# Patient Record
Sex: FEMALE | Race: WHITE | Employment: UNEMPLOYED | URBAN - METROPOLITAN AREA
[De-identification: names, ages, dates, MRNs, and addresses within clinical notes are randomized per-mention and may not be internally consistent; named-entity substitution may affect disease eponyms.]

---

## 2021-08-02 ENCOUNTER — HOSPITAL ENCOUNTER (OUTPATIENT)
Dept: PHYSICAL THERAPY | Age: 66
Setting detail: THERAPIES SERIES
Discharge: HOME | End: 2021-08-02
Payer: MEDICARE

## 2021-08-02 PROCEDURE — 97161 PT EVAL LOW COMPLEX 20 MIN: CPT

## 2021-08-02 PROCEDURE — 97110 THERAPEUTIC EXERCISES: CPT

## 2021-08-02 ASSESSMENT — PAIN DESCRIPTION - PROGRESSION: CLINICAL_PROGRESSION: GRADUALLY WORSENING

## 2021-08-02 ASSESSMENT — PAIN DESCRIPTION - FREQUENCY: FREQUENCY: INTERMITTENT

## 2021-08-02 ASSESSMENT — PAIN DESCRIPTION - ONSET: ONSET: AWAKENED FROM SLEEP

## 2021-08-02 ASSESSMENT — PAIN DESCRIPTION - LOCATION: LOCATION: SHOULDER

## 2021-08-02 ASSESSMENT — PAIN DESCRIPTION - PAIN TYPE: TYPE: ACUTE PAIN;CHRONIC PAIN

## 2021-08-02 ASSESSMENT — PAIN SCALES - GENERAL: PAINLEVEL_OUTOF10: 0

## 2021-08-02 ASSESSMENT — PAIN - FUNCTIONAL ASSESSMENT: PAIN_FUNCTIONAL_ASSESSMENT: PREVENTS OR INTERFERES SOME ACTIVE ACTIVITIES AND ADLS

## 2021-08-02 ASSESSMENT — PAIN DESCRIPTION - DESCRIPTORS: DESCRIPTORS: SHOOTING;SHARP;ACHING;BURNING

## 2021-08-02 ASSESSMENT — PAIN DESCRIPTION - ORIENTATION: ORIENTATION: LEFT

## 2021-08-02 NOTE — FLOWSHEET NOTE
Outpatient Physical Therapy  [place]           [x] Phone: [phone]   Fax: [phone]  [place]           [] Phone: [phone]   Fax: [phone]        Physical Therapy Daily Treatment Note  Date:  2021    Patient Name:  Pierre Cruz    :  1955  MRN: [id number]  Restrictions/Precautions:  -  Diagnosis:   Diagnosis: L Shoulder Pain  Date of Injury/Surgery: -  Treatment Diagnosis: Treatment Diagnosis: Decreased L shoulder mobility and strength limited by pain    Insurance/Certification information:  Medicare   Referring Physician:  Referring Practitioner: Dr. Jason Morse  Next Doctor Visit:  -  Plan of care signed (Y/N):  N, sent 21  Outcome Measure: Quick DASH: 30%  Visit# / total visits:     Pain level: 0/10 at rest, 5/10 activity   Goals:     Patient goals : Reduced pain with activities  Short term goals  Time Frame for Short term goals: 4 weeks  Short term goal 1: Pt demo I w/ HEP and pain management  Short term goal 2: Pt demo Quick DASH score <15% to improve ability to complete ADL's and recreational activities  Short term goal 3: Pt demo shoulder flexion AROM >165 deg with pain <2/10 to improve ability to lift UE overhead  Short term goal 4: Pt demo shoulder abduction AROM >170 deg with pain <3/10 to improve ability to reach into an overhead cabinet     Summary of Evaluation:  Pt is 77year old female with one year gradual intermittent onset of L shoulder pain without JAYJAY. Pt c/o inability to reach overhead without pain, difficulty lifting/carrying/pulling, and sleeping. Pt demo deficits this date that include tednerness of proximal biceps tendon & ant/post GH joint lines, limited AROM into flexion, abduction, and ER due to pain, limited functional IR/ER, positive crank test, (+) resisted isometrics with flexion and abduction away from the body, and decreased RC/scapualr strength.  Testing this date indicate signs and symptoms consistent with biceps tendonosis and ongoing osteoarthritis progression . Pt will benefit with PT services with progressive RC/scapular strengthening, AAROM/PROM within pain free ranges, vasocompression for pain management, and soft tissue of taut musculature as needed to return to PLOF. Pt prior to onset of current condition had min/no pain with able to complete full ADLs and work activities. Due to financial situation and co-pay, patient requested to be seen every two weeks for a follow-up and updated HEP. Subjective:  See morris         Any changes in Ambulatory Summary Sheet? None        Objective:  See eval   COVID screening questions were asked and patient attested that there had been no contact or symptoms        Exercises: (No more than 4 columns)   Exercise/Equipment 8/2/21 #1 Date Date           WARM UP      UBE               TABLE      *Cane Flexion      *Table Slides Flexion & scaption     *Seated Bilat TB ER      Supine Punches      Supine Circles      SL Flexion                  STANDING      *Biceps Doorway Stretch      TB Shoulder Extension                                         PROPRIOCEPTION                                    MODALITIES                      Other Therapeutic Activities/Education:  Patient received education on their current pathology and how their condition effects them with their functional activities. Patient understood discussion and questions were answered. Patient understands their activity limitations and understands rational for treatment progression. Home Exercise Program:  HO issued, reviewed and discussed with patient. Pt agreed to comply. Manual Treatments:  PROM into flexion and abduction x5'      Modalities:  -      Communication with other providers:  morris sent 8/2/21       Assessment:  (Response towards treatment session) (Pain Rating)  Pt is 77year old female with one year gradual intermittent onset of L shoulder pain without JAYJAY.  Pt c/o inability to reach overhead without pain, difficulty lifting/carrying/pulling, and sleeping. Pt demo deficits this date that include tednerness of proximal biceps tendon & ant/post GH joint lines, limited AROM into flexion, abduction, and ER due to pain, limited functional IR/ER, positive crank test, (+) resisted isometrics with flexion and abduction away from the body, and decreased RC/scapualr strength. Testing this date indicate signs and symptoms consistent with biceps tendonosis and ongoing osteoarthritis progression . Pt will benefit with PT services with progressive RC/scapular strengthening, AAROM/PROM within pain free ranges, vasocompression for pain management, and soft tissue of taut musculature as needed to return to PLOF. Pt prior to onset of current condition had min/no pain with able to complete full ADLs and work activities. Due to financial situation and co-pay, patient requested to be seen every two weeks for a follow-up and updated HEP.           Plan for Next Session:  Patient will return in 2 weeks for a follow-up and updated HEP      Time In / Time Out:  3550-0682         If [facility] Please Indicate Time In/Out/Total Time  CPT Code Time in Time out Total Time                                                            Total for session             Timed Code/Total Treatment Minutes:  10'/45'    (1) PT morris   (1) TE 10'      Next Progress Note due:  10th visit       Plan of Care Interventions:  [x] Therapeutic Exercise  [x] Modalities:  [x] Therapeutic Activity     [] Ultrasound  [] Estim  [] Gait Training      [] Cervical Traction [] Lumbar Traction  [x] Neuromuscular Re-education    [] Cold/hotpack [] Iontophoresis   [x] Instruction in HEP      [x] Vasopneumatic   [] Dry Needling    [x] Manual Therapy               [] Aquatic Therapy              Electronically signed by:  Nam Snider, PT, DPT, CSCS 8/2/2021, 9:00 AM

## 2021-08-02 NOTE — PLAN OF CARE
Outpatient Physical Therapy           [place]           [] Phone: [phone]   Fax: [phone]  [place]           [] Phone: [phone]   Fax: [phone]     To: Referring Practitioner: Dr. Ja Phelps   From: Tia Chen, PT, PT     Patient: Nano Nelson       : 1955  Diagnosis: Diagnosis: L Shoulder Pain   Treatment Diagnosis: Treatment Diagnosis: Decreased L shoulder mobility and strength limited by pain   Date: 2021    Physical Therapy Certification/Re-Certification Form  Dear Dr. Ja Phelps,  The following patient has been evaluated for physical therapy services and for therapy to continue, insurance requires physician review of the treatment plan initially and every 90 days. Please review the attached evaluation and/or summary of the patient's plan of care, and verify that you agree therapy should continue by signing the attached document and sending it back to our office. Assessment:  Pt is 77year old female with one year gradual intermittent onset of L shoulder pain without JAYJAY. Pt c/o inability to reach overhead without pain, difficulty lifting/carrying/pulling, and sleeping. Pt demo deficits this date that include tednerness of proximal biceps tendon & ant/post GH joint lines, limited AROM into flexion, abduction, and ER due to pain, limited functional IR/ER, positive crank test, (+) resisted isometrics with flexion and abduction away from the body, and decreased RC/scapualr strength. Testing this date indicate signs and symptoms consistent with biceps tendonosis and ongoing osteoarthritis progression . Pt will benefit with PT services with progressive RC/scapular strengthening, AAROM/PROM within pain free ranges, vasocompression for pain management, and soft tissue of taut musculature as needed to return to PLOF. Pt prior to onset of current condition had min/no pain with able to complete full ADLs and work activities.  Due to financial situation and co-pay, patient requested to be seen every two weeks for a follow-up and updated HEP. Plan of Care/Treatment to date:  [x] Therapeutic Exercise  [x] Modalities:  [x] Therapeutic Activity     [] Ultrasound  [] Electrical Stimulation  [] Gait Training      [] Cervical Traction [] Lumbar Traction  [x] Neuromuscular Re-education    [] Cold/hotpack [] Iontophoresis   [x] Instruction in HEP      [x] Vasopneumatic    [] Dry Needling  [x] Manual Therapy               [] Aquatic Therapy       Other:          Frequency/Duration:  # Days per week: [x] 1 day # Weeks: [] 1 week [] 5 weeks     [] 2 days   [] 2 weeks [] 6 weeks     [] 3 days   [] 3 weeks [] 7 weeks     [] 4 days   [] 4 weeks [x] 8 weeks         [] 9 weeks [] 10 weeks         [] 11 weeks [] 12 weeks    Rehab Potential/Progress: [] Excellent [x] Good [] Fair  [] Poor     Goals:    Patient goals : Reduced pain with activities  Short term goals  Time Frame for Short term goals: 4 weeks  Short term goal 1: Pt demo I w/ HEP and pain management  Short term goal 2: Pt demo Quick DASH score <15% to improve ability to complete ADL's and recreational activities  Short term goal 3: Pt demo shoulder flexion AROM >165 deg with pain <2/10 to improve ability to lift UE overhead  Short term goal 4: Pt demo shoulder abduction AROM >170 deg with pain <3/10 to improve ability to reach into an overhead cabinet      Electronically signed by:  Esther Howell PT, DPT, CSCS 8/2/2021, 4:14 PM        If you have any questions or concerns, please don't hesitate to call.   Thank you for your referral.      Physician Signature:________________________________Date:_________ TIME: _____  By signing above, therapists plan is approved by physician

## 2021-08-02 NOTE — PROGRESS NOTES
Physical Therapy  Initial Assessment  Date: 2021  Patient Name: Madi Feldman  MRN: [id number]  : 1955     Treatment Diagnosis: Decreased L shoulder mobility and strength limited by pain    Subjective   General  Chart Reviewed: Yes  Patient assessed for rehabilitation services?: Yes  Referring Practitioner: Dr. Severa Blue  Diagnosis: L Shoulder Pain  Follows Commands: Within Functional Limits  Subjective  Subjective: Patient reports her L shoulder has been off and on for a year and reports it was a gradual onset. Mentioned it to her doctor who sent her for an x-ray. States it showed some osteoarthritis. She is supposed to get a phone call from her doctor this week for scheduling a follow-up appointment. Consider an injection if therapy does not help. Aggravated by lifting above her stomach, pulling, and lateral movements above her head. Ease with rest and heating pad; has not tried ice and pain medication (Tylenol). Pain Screening  Patient Currently in Pain: Yes  Pain Assessment  Pain Assessment: 0-10  Pain Level: 0 (0/10 at rest, 5/10 with activity)  Pain Type: Acute pain;Chronic pain  Pain Location: Shoulder  Pain Orientation: Left  Pain Descriptors: Shooting; Timbo Her; Aching;Burning  Pain Frequency: Intermittent  Pain Onset: Awakened from sleep (uses a pillow under the arm)  Clinical Progression: Gradually worsening  Functional Pain Assessment: Prevents or interferes some active activities and ADLs  Vital Signs  Patient Currently in Pain: Yes    Vision/Hearing  Vision  Vision: Within Functional Limits  Hearing  Hearing: Exceptions to [facility]  Hearing Exceptions: Bilateral hearing aid    Orientation  Orientation  Overall Orientation Status: Within Normal Limits    Social/Functional History  Social/Functional History  Active : Yes  Mode of Transportation: Car  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    Objective  Observation/Palpation  Posture: Fair  Palpation: Tenderness along proximal biceps tendon, anterior/posterior GH joint line  Observation: Patient presents with forward head posture and increased thoracic kyphosis  Edema: None  Scar: None    PROM LLE (degrees)  LLE General PROM: Limited by pain  AROM LLE (degrees)  LLE General AROM: flexion: 149 deg pain from 90 to end range; abduction: 160 deg with pain all the way through motion; functional ER: c7; functional IR: L1  PROM RUE (degrees)  RUE PROM: WNL  AROM RUE (degrees)  RUE AROM : WNL  Spine  Special Tests: Empty Can: (-), Crossbody Adduction: (+) arm positioned at 90 deg flexion (pain already at this point), Crank test: positive  Joint Mobility  ROM LUE: Approximation: increased pain, distraction: no pain    Strength RUE  Comment: pain with shoulder flexion/abduction away from body  R Shoulder Flexion: 3+/5  R Shoulder Extension: 4+/5  R Shoulder ABduction: 3-/5  R Shoulder Internal Rotation: 4+/5  R Shoulder External Rotation: 4+/5  R Elbow Flexion: 4/5  R Elbow Extension: 5/5    Assessment   Conditions Requiring Skilled Therapeutic Intervention  Body structures, Functions, Activity limitations: Decreased functional mobility ; Decreased endurance;Decreased ADL status; Decreased ROM; Increased pain;Decreased strength  Pt is 77year old female with one year gradual intermittent onset of L shoulder pain without JAYJAY. Pt c/o inability to reach overhead without pain, difficulty lifting/carrying/pulling, and sleeping. Pt demo deficits this date that include tednerness of proximal biceps tendon & ant/post GH joint lines, limited AROM into flexion, abduction, and ER due to pain, limited functional IR/ER, positive crank test, (+) resisted isometrics with flexion and abduction away from the body, and decreased RC/scapualr strength. Testing this date indicate signs and symptoms consistent with biceps tendonosis and ongoing osteoarthritis progression .  Pt will benefit with PT services with progressive RC/scapular strengthening, AAROM/PROM within pain free ranges, vasocompression for pain management, and soft tissue of taut musculature as needed to return to PLOF. Pt prior to onset of current condition had min/no pain with able to complete full ADLs and work activities. Due to financial situation and co-pay, patient requested to be seen every two weeks for a follow-up and updated HEP.    Treatment Diagnosis: Decreased L shoulder mobility and strength limited by pain  Prognosis: Good  Decision Making: Low Complexity  History: previous RC tendonitis on RUE  Barriers to Learning: None  REQUIRES PT FOLLOW UP: Yes  Activity Tolerance  Activity Tolerance: Patient Tolerated treatment well;Patient limited by fatigue;Patient limited by pain      Plan   Plan  Times per week: 1x  Plan weeks: 8 weeks  Specific instructions for Next Treatment: Patient will return in 2 weeks for a follow-up and updated HEP  Current Treatment Recommendations: Strengthening, Neuromuscular Re-education, Home Exercise Program, ROM, Manual Therapy - Soft Tissue Mobilization, Manual Therapy - Joint Manipulation, Pain Management    Goals  Short term goals  Time Frame for Short term goals: 4 weeks  Short term goal 1: Pt demo I w/ HEP and pain management  Short term goal 2: Pt demo Quick DASH score <15% to improve ability to complete ADL's and recreational activities  Short term goal 3: Pt demo shoulder flexion AROM >165 deg with pain <2/10 to improve ability to lift UE overhead  Short term goal 4: Pt demo shoulder abduction AROM >170 deg with pain <3/10 to improve ability to reach into an overhead cabinet  Patient Goals   Patient goals : Reduced pain with activities    Nam Snider, PT, DPT, CSCS

## 2021-08-16 ENCOUNTER — HOSPITAL ENCOUNTER (OUTPATIENT)
Dept: PHYSICAL THERAPY | Age: 66
Setting detail: THERAPIES SERIES
Discharge: HOME | End: 2021-08-16
Payer: MEDICARE

## 2021-08-16 PROCEDURE — 97140 MANUAL THERAPY 1/> REGIONS: CPT

## 2021-08-16 PROCEDURE — 97110 THERAPEUTIC EXERCISES: CPT

## 2021-08-16 NOTE — FLOWSHEET NOTE
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 Ralph Hernandez  Next Doctor Visit:  -  Plan of care signed (Y/N):  N, sent 21  Outcome Measure: Quick DASH: 30%  Visit# / total visits:     Pain level: 0/10 at rest, 5/10 activity   Goals:     Patient goals : Reduced pain with activities  Short term goals  Time Frame for Short term goals: 4 weeks  Short term goal 1: Pt demo I w/ HEP and pain management  Short term goal 2: Pt demo Quick DASH score <15% to improve ability to complete ADL's and recreational activities  Short term goal 3: Pt demo shoulder flexion AROM >165 deg with pain <2/10 to improve ability to lift UE overhead  Short term goal 4: Pt demo shoulder abduction AROM >170 deg with pain <3/10 to improve ability to reach into an overhead cabinet     Summary of Evaluation:  Pt is 77year old female with one year gradual intermittent onset of L shoulder pain without JAYJAY. Pt c/o inability to reach overhead without pain, difficulty lifting/carrying/pulling, and sleeping.  Pt demo deficits this date that include tednerness of proximal biceps tendon & ant/post GH joint lines, limited AROM into flexion, abduction, and ER due to pain, limited functional IR/ER, positive crank test, (+) resisted isometrics with flexion and abduction away from the body, and decreased RC/scapualr Traction  [x] Neuromuscular Re-education    [] Cold/hotpack [] Iontophoresis   [x] Instruction in HEP      [x] Vasopneumatic   [] Dry Needling    [x] Manual Therapy               [] Aquatic Therapy              Electronically signed by:  Naveed Murphy, PTA   8/16/21

## 2021-08-17 NOTE — FLOWSHEET NOTE
Patients Plan of Care was received and signed. Signed POC was scanned and placed in the patients chart.     Estefani Pack

## 2021-08-30 ENCOUNTER — HOSPITAL ENCOUNTER (OUTPATIENT)
Dept: PHYSICAL THERAPY | Age: 66
Setting detail: THERAPIES SERIES
Discharge: HOME | End: 2021-08-30
Payer: MEDICARE

## 2021-08-30 PROCEDURE — 97110 THERAPEUTIC EXERCISES: CPT

## 2021-08-30 PROCEDURE — 97530 THERAPEUTIC ACTIVITIES: CPT

## 2021-08-30 NOTE — FLOWSHEET NOTE
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 Maritza Reeves  Next Doctor Visit:  -  Plan of care signed (Y/N):  N, sent 21  Outcome Measure: Quick DASH: 30%  Visit# / total visits:   3/8  Pain level: 0/10 at rest, 5/10 activity   Goals:     Patient goals : Reduced pain with activities  Short term goals  Time Frame for Short term goals: 4 weeks  Short term goal 1: Pt demo I w/ HEP and pain management  Short term goal 2: Pt demo Quick DASH score <15% to improve ability to complete ADL's and recreational activities  Short term goal 3: Pt demo shoulder flexion AROM >165 deg with pain <2/10 to improve ability to lift UE overhead  Short term goal 4: Pt demo shoulder abduction AROM >170 deg with pain <3/10 to improve ability to reach into an overhead cabinet     Summary of Evaluation:  Pt is 77year old female with one year gradual intermittent onset of L shoulder pain without JAYJAY. Pt c/o inability to reach overhead without pain, difficulty lifting/carrying/pulling, and sleeping.  Pt demo deficits this date that include tednerness of proximal biceps tendon & ant/post GH joint lines, limited AROM into flexion, abduction, and ER due to pain, limited functional IR/ER, positive crank test, (+) resisted isometrics with flexion and abduction away from the body, and decreased RC/scapualr strength. Testing this date indicate signs and symptoms consistent with biceps tendonosis and ongoing osteoarthritis progression . Pt will benefit with PT services with progressive RC/scapular strengthening, AAROM/PROM within pain free ranges, vasocompression for pain management, and soft tissue of taut musculature as needed to return to PLOF. Pt prior to onset of current condition had min/no pain with able to complete full ADLs and work activities. Due to financial situation and co-pay, patient requested to be seen every two weeks for a follow-up and updated HEP. Subjective:  Pt arrives to tx session reporting 0/10 pain at rest and 2/10. She continues to take tylenol for her arthritis. Reports tossing and turning last night while sleeping. Any changes in Ambulatory Summary Sheet? None        Objective:  See eval   COVID screening questions were asked and patient attested that there had been no contact or symptoms    **Please wear face shield, patient does not have her hearing aides and prefers to read lips    Exercises: (No more than 4 columns)   Exercise/Equipment 8/2/21 #1 8/16/21 #2 8/30/21 #3           WARM UP      UBE      5' @ 90 deg          TABLE      *Cane Flexion  2x10 2x10   *Table Slides Flexion & scaption Flexion & scaption    *Seated Bilat TB ER  X10 GTB 2x10 YTB   Supine Punches  x10 2x10   Supine Circles   2x10 ea dir   SL Flexion   2x10 to 90 deg    ABC's  x2    TB Habd   2x10 YTB below 90 deg    STANDING      *Biceps Doorway Stretch      TB Shoulder Extension  2x10 2x10 RTB   Rows   2x10 2x10 RTB   Roller on Wall   x15 within pain free range   Scaption Raise   x10 no weight                    PROPRIOCEPTION                                    MODALITIES                      Other Therapeutic Activities/Education:  Patient received education on their current pathology and how their condition effects them with their functional activities.  Patient understood discussion and questions were answered. Patient understands their activity limitations and understands rational for treatment progression. Home Exercise Program:  HO issued, reviewed and discussed with patient. Pt agreed to comply. Manual Treatments:        Modalities:  -      Communication with other providers:  morris sent 8/2/21       Assessment:  Pt demonstrates good tolerance to today's session and no adverse effects reported. With improving patient tolerance to activity, able to introduce supine punches, supine circles, SL flexion. After discussion with patient, she will be returning to swimming at [facility] 3 times a week and would like to continue her program independently. Next session will complete progress note and will likely place patient on hold for 30 days. End of session: 2/10       Pt is 77year old female with one year gradual intermittent onset of L shoulder pain without JAYJAY. Pt c/o inability to reach overhead without pain, difficulty lifting/carrying/pulling, and sleeping. Pt demo deficits this date that include tednerness of proximal biceps tendon & ant/post GH joint lines, limited AROM into flexion, abduction, and ER due to pain, limited functional IR/ER, positive crank test, (+) resisted isometrics with flexion and abduction away from the body, and decreased RC/scapualr strength. Testing this date indicate signs and symptoms consistent with biceps tendonosis and ongoing osteoarthritis progression . Pt will benefit with PT services with progressive RC/scapular strengthening, AAROM/PROM within pain free ranges, vasocompression for pain management, and soft tissue of taut musculature as needed to return to PLOF. Pt prior to onset of current condition had min/no pain with able to complete full ADLs and work activities. Due to financial situation and co-pay, patient requested to be seen every two weeks for a follow-up and updated HEP.           Plan for Next Session:  Patient will return in 2 weeks for a follow-up and updated HEP      Time In / Time Out:    3836-6899       If [facility] Please Indicate Time In/Out/Total Time  CPT Code Time in Time out Total Time                                                            Total for session             Timed Code/Total Treatment Minutes:  36' / 40'    2 TE  1 TA    Next Progress Note due:  10th visit       Plan of Care Interventions:  [x] Therapeutic Exercise  [x] Modalities:  [x] Therapeutic Activity     [] Ultrasound  [] Estim  [] Gait Training      [] Cervical Traction [] Lumbar Traction  [x] Neuromuscular Re-education    [] Cold/hotpack [] Iontophoresis   [x] Instruction in HEP      [x] Vasopneumatic   [] Dry Needling    [x] Manual Therapy               [] Aquatic Therapy              Electronically signed by:  Nam Snider, PT, DPT, CSCS

## 2021-09-13 ENCOUNTER — HOSPITAL ENCOUNTER (OUTPATIENT)
Dept: PHYSICAL THERAPY | Age: 66
Setting detail: THERAPIES SERIES
Discharge: HOME | End: 2021-09-13
Payer: MEDICARE

## 2021-09-13 PROCEDURE — 97530 THERAPEUTIC ACTIVITIES: CPT

## 2021-09-13 PROCEDURE — 97110 THERAPEUTIC EXERCISES: CPT

## 2021-09-13 NOTE — FLOWSHEET NOTE
Outpatient Physical Therapy  [place]           [x] Phone: [phone]   Fax: [phone]  Jacqueline Nicolas           [] Phone: [phone]   Fax: [phone]        Physical Therapy Daily Treatment Note  Date:  2021    Patient Name:  Garth Julien    :  1955  MRN: [id number]  Restrictions/Precautions:  *Patient reports being approx. 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 Yvrose Tariq  Next Doctor Visit:  -  Plan of care signed (Y/N):  Pending, sent 21  Outcome Measure: Quick DASH: 34%  Visit# / total visits:     Pain level: 0/10 at rest, 5/10 activity   Goals:     Patient goals : Reduced pain with activities  Short term goals  Time Frame for Short term goals: 4 weeks  Short term goal 1: Pt demo I w/ HEP and pain management Reports compliance  Short term goal 2: Pt demo Quick DASH score <15% to improve ability to complete ADL's and recreational activities Improving 34%  Short term goal 3: Pt demo shoulder flexion AROM >165 deg with pain <2/10 to improve ability to lift UE overhead Improving 160 deg, with pain at 90 deg and easing past this point  Short term goal 4: Pt demo shoulder abduction AROM >170 deg with pain <3/10 to improve ability to reach into an overhead cabinet Improving     Summary of Evaluation:  Pt is 77year old female with one year gradual intermittent onset of L shoulder pain without JAYJAY. Pt c/o inability to reach overhead without pain, difficulty lifting/carrying/pulling, and sleeping.  Pt demo deficits this date that include tednerness of proximal biceps tendon & ant/post GH joint lines, limited AROM into flexion, abduction, and ER due to pain, limited functional IR/ER, positive crank test, (+) resisted isometrics with flexion and abduction away from the body, and decreased RC/scapualr strength. Testing this date indicate signs and symptoms consistent with biceps tendonosis and ongoing osteoarthritis progression . Pt will benefit with PT services with progressive RC/scapular strengthening, AAROM/PROM within pain free ranges, vasocompression for pain management, and soft tissue of taut musculature as needed to return to PLOF. Pt prior to onset of current condition had min/no pain with able to complete full ADLs and work activities. Due to financial situation and co-pay, patient requested to be seen every two weeks for a follow-up and updated HEP. Subjective:   Sophy Reyes states today is a good day for her shoulder and reports no pain/discomfort. States it's random when her shoulder decided to hurt. Reports she has not started swimming yet, but will be soon. Reports brushing her hair and putting clothes on over her head remain the most difficult. Will return to Dr. Jo Finch in November for a follow-up. States swimming had been helping a lot, but since her pool has been closed it feels like she has back tracked a bit.         Any changes in Ambulatory Summary Sheet?   None        Objective:  See eval   COVID screening questions were asked and patient attested that there had been no contact or symptoms    **Please wear face shield, patient does not have her hearing aides and prefers to read lips    Exercises: (No more than 4 columns)   Exercise/Equipment 8/2/21 #1 8/16/21 #2 8/30/21 #3 9/13/21 #4            WARM UP       UBE      5' @ 90 deg  5' @ 90 deg           TABLE       *Cane Flexion  2x10 2x10    *Table Slides Flexion & scaption Flexion & scaption     *Seated Bilat TB ER  X10 GTB 2x10 YTB 2x10 YTB   Supine Punches  x10 2x10 2x10 2#   Supine Circles   2x10 ea dir 2x10 ea dir 2#   SL Flexion   2x10 to 90 deg     ABC's  x2     TB Habd   2x10 YTB below 90 deg     STANDING overhead without pain, difficulty lifting/carrying/pulling, and sleeping. Pt demo deficits this date that include tednerness of proximal biceps tendon & ant/post GH joint lines, limited AROM into flexion, abduction, and ER due to pain, limited functional IR/ER, positive crank test, (+) resisted isometrics with flexion and abduction away from the body, and decreased RC/scapualr strength. Testing this date indicate signs and symptoms consistent with biceps tendonosis and ongoing osteoarthritis progression . Pt will benefit with PT services with progressive RC/scapular strengthening, AAROM/PROM within pain free ranges, vasocompression for pain management, and soft tissue of taut musculature as needed to return to PLOF. Pt prior to onset of current condition had min/no pain with able to complete full ADLs and work activities. Due to financial situation and co-pay, patient requested to be seen every two weeks for a follow-up and updated HEP.           Plan for Next Session:  --      Time In / Time Out:    2561-9030       If [facility] Please Indicate Time In/Out/Total Time  CPT Code Time in Time out Total Time                                                            Total for session             Timed Code/Total Treatment Minutes:  36' / 40'    2 TE  1 TA    Next Progress Note due:  10th visit       Plan of Care Interventions:  [x] Therapeutic Exercise  [x] Modalities:  [x] Therapeutic Activity     [] Ultrasound  [] Estim  [] Gait Training      [] Cervical Traction [] Lumbar Traction  [x] Neuromuscular Re-education    [] Cold/hotpack [] Iontophoresis   [x] Instruction in HEP      [x] Vasopneumatic   [] Dry Needling    [x] Manual Therapy               [] Aquatic Therapy              Electronically signed by:  Tia Chen, PT, DPT, CSCS

## 2021-09-13 NOTE — PROGRESS NOTES
Outpatient Physical Therapy           [place]           [] Phone: [phone]   Fax: [phone]  [place]           [] Phone: [phone]   Fax: [phone]      To:   Dr. Mihaela Torrez    From: Dora Jackson, PT, PT     Patient: Alexis Reyes                  : 1955  Diagnosis:    L Shoulder Pain     Date: 2021  Treatment Diagnosis:  Decreased L shoulder mobility and strength limited by pain         [x]  Progress Note                []  Discharge Note    Evaluation Date:  21   Total Visits to date:  4  Cancels/No-shows to date:      Subjective:  Stevan Mccord states today is a good day for her shoulder and reports no pain/discomfort. States it's random when her shoulder decided to hurt. Reports she has not started swimming yet, but will be soon. Reports brushing her hair and putting clothes on over her head remain the most difficult. Will return to Dr. Mihaela Torrez in November for a follow-up. States swimming had been helping a lot, but since her pool has been closed it feels like she has back tracked a bit.       Plan of Care/Treatment to date:  [x] Therapeutic Exercise    [x] Modalities:  [x] Therapeutic Activity     [] Ultrasound  [] Electrical Stimulation  [] Gait Training      [] Cervical Traction   [] Lumbar Traction  [x] Neuromuscular Re-education  [x] Cold/hotpack [] Iontophoresis  [x] Instruction in HEP      Other:  [x] Manual Therapy       []  Vasopneumatic  [] Aquatic Therapy       []   Dry Needle Therapy                        Objective/Significant Findings At Last Visit/Comments:    LUE General AROM:   Flexion: 160 deg; pain begins at 90 and eases up until end range   Abduction: 160 deg; pain eases up until end range  Functional ER: T1; increased pain around 90 deg, noted clicking and popping   Functional IR: T12    Special Tests: Empty Can: (+), Crossbody Adduction: (-) arm positioned at 90 deg flexion (pain with Habd), Crank test: slight discomfort noted    Joint Mobility LUE: posterior joint mobility limited; marvinetn reports \"some relief\", distraction: no pain     Strength LUE  L Shoulder Flexion: 4-/5, increased pain with arm at 90 deg  L Shoulder Extension: 5/5  L Shoulder ABduction: 4-/5  L Shoulder Internal Rotation: 5/5  L Shoulder External Rotation: 5/5  L Elbow Flexion: 5/5  L Elbow Extension: 5/5    Assessment:   Nandini Barber has completed 4 visits since the start of therapy on 8/2/21. Patient demonstrates improvements in AROM/PROM tolerance with pain noted at approx. 90 deg of flex and abduction and eases for the rest of the movement. Patient has noted good improvements with incorporating swimming and her HEP, but has been unable to get to the pool these last couple of weeks due to it being closed. Has good improvements with shoulder/RC strengthening, but continues to note increased pain with isometrics away from her body. Patient is making good progress towards her goals and educated patient on appropriate activity progressions. Due to patient financial situation and co-pay, patient request to continue independently completing her HEP and swimming 3x per week. Will place patient on hold for the next 30 days and told patient to call if she would like to update HEP/status change.       Goal Status:  [] Achieved [x] Partially Achieved  [x] Not Achieved     Changes to goals:    Patient goals : Reduced pain with activities  Short term goals  Time Frame for Short term goals: 4 weeks  Short term goal 1: Pt demo I w/ HEP and pain management Reports compliance  Short term goal 2: Pt demo Quick DASH score <15% to improve ability to complete ADL's and recreational activities Improving 34%  Short term goal 3: Pt demo shoulder flexion AROM >165 deg with pain <2/10 to improve ability to lift UE overhead Improving 160 deg, with pain at 90 deg and easing past this point  Short term goal 4: Pt demo shoulder abduction AROM >170 deg with pain <3/10 to improve ability to reach into an overhead cabinet Improving      Frequency/Duration:  # Days per week: [] 1 day # Weeks: [] 1 week [x] 4 weeks [] 8 weeks     [x] 2 days   [] 2 weeks [] 5 weeks [] 10 weeks     [] 3 days   [] 3 weeks [] 6 weeks [] 12 weeks       Rehab Potential: [] Excellent [x] Good [] Fair  [] Poor      Patient Status: [x] Continue per initial plan of Care     [] Patient now discharged     [] Additional visits requested, Please re-certify for additional visits: If we are requesting more visits, we fully anticipate the patient's condition is expected to improve within the treatment timeframe we are requesting. Electronically signed by:  Percy Fleming PT, TATO, KIRT 9/13/2021, 1:05 PM    If you have any questions or concerns, please don't hesitate to call.   Thank you for your referral.    Physician Signature:______________________ Date:______ Time: ________  By signing above, therapists plan is approved by physician

## 2022-12-15 ENCOUNTER — TELEPHONE (OUTPATIENT)
Dept: INFECTIOUS DISEASES | Age: 67
End: 2022-12-15

## 2022-12-15 ENCOUNTER — OFFICE VISIT (OUTPATIENT)
Dept: INFECTIOUS DISEASES | Age: 67
End: 2022-12-15

## 2022-12-15 VITALS
HEART RATE: 51 BPM | DIASTOLIC BLOOD PRESSURE: 68 MMHG | RESPIRATION RATE: 18 BRPM | SYSTOLIC BLOOD PRESSURE: 134 MMHG | TEMPERATURE: 97.1 F | WEIGHT: 249.2 LBS

## 2022-12-15 DIAGNOSIS — Z22.7 LATENT TUBERCULOSIS BY BLOOD TEST: Primary | ICD-10-CM

## 2022-12-15 RX ORDER — M-VIT,TX,IRON,MINS/CALC/FOLIC 27MG-0.4MG
1 TABLET ORAL DAILY
COMMUNITY

## 2022-12-15 RX ORDER — LOSARTAN POTASSIUM 25 MG/1
25 TABLET ORAL 2 TIMES DAILY
COMMUNITY

## 2022-12-15 RX ORDER — ISONIAZID 300 MG/1
900 TABLET ORAL WEEKLY
Qty: 36 TABLET | Refills: 0 | Status: SHIPPED | OUTPATIENT
Start: 2022-12-15 | End: 2023-03-03

## 2022-12-15 RX ORDER — AMLODIPINE BESYLATE 5 MG/1
5 TABLET ORAL 2 TIMES DAILY
COMMUNITY

## 2022-12-15 NOTE — ASSESSMENT & PLAN NOTE
Counseled on latent TB and active TB. She was told about the risk of reactivation. The benefits of treatment versus risk of adverse effects of medications were discussed. She has elected to be treated with a 12-week course of isoniazid and rifapentine. Hepatic panel will be done once a month.

## 2022-12-15 NOTE — PROGRESS NOTES
12/15/2022         Referring Physician: LYNN Goins *  Primary Care Physician: Samuel Wise MD    Impression/Plan:   Diagnosis Orders   1. Latent tuberculosis by blood test  Rifapentine 150 MG TABS    isoniazid (NYDRAZID) 300 MG tablet    Hepatic Function Panel          Discussion:  Latent tuberculosis by blood test  Counseled on latent TB and active TB. She was told about the risk of reactivation. The benefits of treatment versus risk of adverse effects of medications were discussed. She has elected to be treated with a 12-week course of isoniazid and rifapentine. Hepatic panel will be done once a month. Return in about 4 weeks (around 1/12/2023). 35 minutes was spent in the encounter; more than 50% of the face-to-face time was spent with the patient providing counseling and coordination of care. History: Corinne Jacobus is a 79 y.o.  female presenting today for positive QuantiFERON test..She underwent employee health screening and QuantiFERON test came back positive. She denies cough, chest pain, weight loss, night sweats. She had a chest x-ray done on 11/3/2022 that was that did not show any acute changes. She has worked various jobs but has not worked previously in [place]. She lives alone at home and has pet cats. She denies exposure to anyone with known tuberculosis. She traveled to [facility] for 3 weeks in [place] for this life. Review of Systems   All other systems reviewed and are negative. No Known Allergies    Patient Active Problem List   Diagnosis    Latent tuberculosis by blood test         Current Outpatient Medications   Medication Sig Dispense Refill    amLODIPine (NORVASC) 5 MG tablet Take 5 mg by mouth in the morning and 5 mg in the evening.       losartan (COZAAR) 25 MG tablet Take 25 mg by mouth 2 times daily at 0800 and 1400      Multiple Vitamins-Minerals (THERAPEUTIC MULTIVITAMIN-MINERALS) tablet Take 1 tablet by mouth daily Rifapentine 150 MG TABS Take 900 mg by mouth once a week for 12 doses 72 tablet 0    isoniazid (NYDRAZID) 300 MG tablet Take 3 tablets by mouth once a week for 12 doses 36 tablet 0     No current facility-administered medications for this visit. No past medical history on file. No past surgical history on file. Social History     Socioeconomic History    Marital status: Single     Spouse name: Not on file    Number of children: Not on file    Years of education: Not on file    Highest education level: Not on file   Occupational History    Not on file   Tobacco Use    Smoking status: Never     Passive exposure: Never    Smokeless tobacco: Never   Substance and Sexual Activity    Alcohol use: Never    Drug use: Never    Sexual activity: Not on file   Other Topics Concern    Not on file   Social History Narrative    Not on file     Social Determinants of Health     Financial Resource Strain: Not on file   Food Insecurity: Not on file   Transportation Needs: Not on file   Physical Activity: Not on file   Stress: Not on file   Social Connections: Not on file   Intimate Partner Violence: Not on file   Housing Stability: Not on file       No family history on file.     Vital Signs:  Vitals:    12/15/22 0956   BP: 134/68   Site: Left Upper Arm   Position: Sitting   Cuff Size: Large Adult   Pulse: 51   Resp: 18   Temp: 97.1 °F (36.2 °C)   TempSrc: Infrared   Weight: 249 lb 3.2 oz (113 kg)        Wt Readings from Last 3 Encounters:   12/15/22 249 lb 3.2 oz (113 kg)        Physical Exam:   Gen: alert and NAD  HEENT: sclera clear, pupils equal and reactive, extra ocular muscles intact, oropharynx clear, mucus membranes moist, tympanic membranes clear bilaterally, no cervical lymphadenopathy noted, and neck supple  Neck: supple, no significant adenopathy  Chest: clear to auscultation, no wheezes, rales or rhonchi, symmetric air entry  Heart: regular rate and rhythm, no murmurs  ABD: abdomen is soft without significant

## 2022-12-16 ENCOUNTER — TELEPHONE (OUTPATIENT)
Dept: INFECTIOUS DISEASES | Age: 67
End: 2022-12-16

## 2022-12-16 NOTE — TELEPHONE ENCOUNTER
Pt called and states that she can not get the rifapentine anywhere. Pt wants to know if you could prescribe a different medication to help. Please advise.

## 2022-12-20 NOTE — TELEPHONE ENCOUNTER
Per conversation with Dr. Brynn Mcknight, there is no suitable alternative. Do not take isoniazid until rifapentine is available.

## 2022-12-20 NOTE — TELEPHONE ENCOUNTER
Pt called back and I let her know not to take isoniazid without rifapentine. That are made to work together. Pt voiced understanding.